# Patient Record
(demographics unavailable — no encounter records)

---

## 2019-10-29 NOTE — ERPHSYRPT
- History of Present Illness


Time Seen by Provider: 10/29/19 07:05


Source: patient, family


Exam Limitations: no limitations


Patient Subjective Stated Complaint: lower back pain


Triage Nursing Assessment: Pt states "I have lower back pain and can barely 

stand."


Physician History: 





27 y/o white male presents 24 hours after feeling a popping sensation in lower 

back. pain worse this am. can hardly stand up. no urinary sx. pain shooting 

down right buttock and leg. 


Timing/Duration: today


Method of Injury: twisted, turning


Quality: burning, radiating


Back Pain Location: lumbar spine


Back Pain Radiation: buttocks, upper legs


Severity of Pain-Max: moderate


Severity of Pain-Current: moderate


Previous symptoms: no prior history


Allergies/Adverse Reactions: 








No Known Drug Allergies Allergy (Unverified 10/29/19 07:09)


 





Hx Tetanus, Diphtheria Vaccination/Date Given: No


Hx Influenza Vaccination/Date Given: No


Hx Pneumococcal Vaccination/Date Given: No


Immunizations Up to Date: Yes





- Review of Systems


Constitutional: No Symptoms


Eyes: No Symptoms


Ears, Nose, & Throat: No Symptoms


Respiratory: No Symptoms


Cardiac: No Symptoms


Abdominal/Gastrointestinal: No Symptoms


Genitourinary Symptoms: No Symptoms


Musculoskeletal: Back Pain


Skin: No Symptoms


Neurological: No Symptoms


Psychological: No Symptoms


Endocrine: No Symptoms


Hematologic/Lymphatic: No Symptoms


Immunological/Allergic: No Symptoms


All Other Systems: Reviewed and Negative





- Past Medical History


Pertinent Past Medical History: No


Neurological History: No Pertinent History


ENT History: No Pertinent History


Cardiac History: No Pertinent History


Respiratory History: No Pertinent History


Endocrine Medical History: No Pertinent History


Musculoskeletal History: No Pertinent History


GI Medical History: No Pertinent History


 History: No Pertinent History


Psycho-Social History: No Pertinent History


Male Reproductive Disorders: No Pertinent History





- Past Surgical History


Past Surgical History: No


Neuro Surgical History: No Pertinent History


Cardiac: No Pertinent History


Respiratory: No Pertinent History


Gastrointestinal: No Pertinent History


Genitourinary: No Pertinent History


Musculoskeletal: No Pertinent History


Male Surgical History: No Pertinent History





- Social History


Smoking Status: Current every day smoker


How long have you smoked: years


Exposure to second hand smoke: Yes


Drug Use: none


Patient Lives Alone: No





- Nursing Vital Signs


Nursing Vital Signs: 


 Initial Vital Signs











Temperature  98.0 F   10/29/19 07:02


 


Pulse Rate  62   10/29/19 07:02


 


Respiratory Rate  18   10/29/19 07:02


 


Blood Pressure  121/54   10/29/19 07:02


 


O2 Sat by Pulse Oximetry  100   10/29/19 07:02








 Pain Scale











Pain Intensity [Lower Back]    8


 


Pain Intensity                 5

















- Physical Exam


General Appearance: mild distress, alert, anxiety


Eye Exam: PERRL/EOMI, eyes nml inspection


Ears, Nose, Throat Exam: normal ENT inspection, moist mucous membranes


Neck Exam: normal inspection, non-tender, supple, full range of motion


Respiratory Exam: No chest tenderness


Gastrointestinal Exam: No tenderness


Rectal Exam: not done


Back Exam: normal inspection, normal range of motion, No CVA tenderness, No 

vertebral tenderness


Extremity Exam: normal inspection, normal range of motion, pelvis stable


Neurologic Exam: alert, oriented x 3, cooperative, CNs II-XII nml as tested


Skin Exam: normal color, warm, dry


Lymphatic Exam: No adenopathy


**SpO2 Interpretation**: normal


SpO2: 100


O2 Delivery: Room Air


Ordered Tests: 


 Active Orders 24 hr











 Category Date Time Status


 


 LUMBAR LIMITED (2 OR 3 VIEWS) Stat Exams  10/29/19 07:17 Taken








Medication Summary














Discontinued Medications














Generic Name Dose Route Start Last Admin





  Trade Name Patriceq  PRN Reason Stop Dose Admin


 


Methylprednisolone Sodium Succinate  125 mg  10/29/19 07:20  10/29/19 07:35





  Solu-Medrol 125 Mg***  IM  10/29/19 07:21  125 mg





  STAT ONE   Administration





     





     





     





     


 


Methylprednisolone Sodium Succinate  Confirm  10/29/19 07:22  





  Solu-Medrol 125 Mg***  Administered  10/29/19 07:23  





  Dose   





  125 mg   





  .ROUTE   





  .STK-MED ONE   





     





     





     





     


 


Orphenadrine Citrate  60 mg  10/29/19 07:19  10/29/19 07:35





  Norflex 60 Mg/2 Ml***  IM  10/29/19 07:20  60 mg





  STAT ONE   Administration





     





     





     





     


 


Orphenadrine Citrate  Confirm  10/29/19 07:22  





  Norflex 60 Mg/2 Ml***  Administered  10/29/19 07:23  





  Dose   





  60 mg   





  .ROUTE   





  .STK-MED ONE   





     





     





     





     














- Progress


Progress: improved


Progress Note: 





10/29/19 09:02


lumbar spine-? old mild compression of l5. no acute fx or subluxation


Counseled pt/family regarding: diagnosis, need for follow-up, rad results





- Departure


Departure Disposition: Home


Clinical Impression: 


 Low back pain, Sciatica





Condition: Stable


Critical Care Time: No


Referrals: 


FEDERICA ANN [Primary Care Provider] - 


Additional Instructions: 


follow up with primary doctor for further management. 


Forms:  Work/School Release Form


Prescriptions: 


Carisoprodol 350 mg** [Soma 350 mg**] 350 mg PO Q8H PRN PRN #10 tablet


 PRN Reason: Muscle Spasms


Prednisone 10 mg*** [Deltasone 10 mg***] 10 mg PO TID #12 tablet